# Patient Record
Sex: FEMALE | Race: WHITE | NOT HISPANIC OR LATINO | Employment: STUDENT | URBAN - METROPOLITAN AREA
[De-identification: names, ages, dates, MRNs, and addresses within clinical notes are randomized per-mention and may not be internally consistent; named-entity substitution may affect disease eponyms.]

---
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
## 2022-03-27 ENCOUNTER — HOSPITAL ENCOUNTER (EMERGENCY)
Facility: CLINIC | Age: 22
Discharge: HOME | End: 2022-03-27
Attending: NURSE PRACTITIONER | Admitting: NURSE PRACTITIONER
Payer: COMMERCIAL

## 2022-03-27 VITALS
DIASTOLIC BLOOD PRESSURE: 66 MMHG | WEIGHT: 125 LBS | OXYGEN SATURATION: 99 % | HEART RATE: 98 BPM | SYSTOLIC BLOOD PRESSURE: 130 MMHG | RESPIRATION RATE: 16 BRPM | TEMPERATURE: 98 F

## 2022-03-27 DIAGNOSIS — R10.84 ABDOMINAL PAIN, GENERALIZED: ICD-10-CM

## 2022-03-27 LAB
ALBUMIN SERPL-MCNC: 3.8 G/DL (ref 3.4–5)
ALBUMIN UR-MCNC: NEGATIVE MG/DL
ALP SERPL-CCNC: 72 U/L (ref 40–150)
ALT SERPL W P-5'-P-CCNC: 12 U/L (ref 0–50)
ANION GAP SERPL CALCULATED.3IONS-SCNC: 4 MMOL/L (ref 3–14)
APPEARANCE UR: CLEAR
AST SERPL W P-5'-P-CCNC: 16 U/L (ref 0–35)
BACTERIA #/AREA URNS HPF: ABNORMAL /HPF
BASOPHILS # BLD AUTO: 0.1 10E3/UL (ref 0–0.2)
BASOPHILS NFR BLD AUTO: 1 %
BILIRUB SERPL-MCNC: 0.3 MG/DL (ref 0.2–1.3)
BILIRUB UR QL STRIP: NEGATIVE
BUN SERPL-MCNC: 12 MG/DL (ref 7–30)
CALCIUM SERPL-MCNC: 8.8 MG/DL (ref 8.5–10.1)
CHLORIDE BLD-SCNC: 105 MMOL/L (ref 96–110)
CO2 SERPL-SCNC: 28 MMOL/L (ref 20–32)
COLOR UR AUTO: ABNORMAL
CREAT SERPL-MCNC: 0.78 MG/DL (ref 0.5–1)
EOSINOPHIL # BLD AUTO: 0 10E3/UL (ref 0–0.7)
EOSINOPHIL NFR BLD AUTO: 0 %
ERYTHROCYTE [DISTWIDTH] IN BLOOD BY AUTOMATED COUNT: 12.6 % (ref 10–15)
GFR SERPL CREATININE-BSD FRML MDRD: >90 ML/MIN/1.73M2
GLUCOSE BLD-MCNC: 104 MG/DL (ref 70–99)
GLUCOSE UR STRIP-MCNC: NEGATIVE MG/DL
HCG UR QL: NEGATIVE
HCT VFR BLD AUTO: 38.4 % (ref 35–47)
HGB BLD-MCNC: 13 G/DL (ref 11.7–15.7)
HGB UR QL STRIP: NEGATIVE
IMM GRANULOCYTES # BLD: 0 10E3/UL
IMM GRANULOCYTES NFR BLD: 1 %
KETONES UR STRIP-MCNC: NEGATIVE MG/DL
LEUKOCYTE ESTERASE UR QL STRIP: NEGATIVE
LYMPHOCYTES # BLD AUTO: 1.1 10E3/UL (ref 0.8–5.3)
LYMPHOCYTES NFR BLD AUTO: 15 %
MCH RBC QN AUTO: 30.4 PG (ref 26.5–33)
MCHC RBC AUTO-ENTMCNC: 33.9 G/DL (ref 31.5–36.5)
MCV RBC AUTO: 90 FL (ref 78–100)
MONOCYTES # BLD AUTO: 0.4 10E3/UL (ref 0–1.3)
MONOCYTES NFR BLD AUTO: 5 %
MUCOUS THREADS #/AREA URNS LPF: PRESENT /LPF
NEUTROPHILS # BLD AUTO: 5.7 10E3/UL (ref 1.6–8.3)
NEUTROPHILS NFR BLD AUTO: 78 %
NITRATE UR QL: NEGATIVE
NRBC # BLD AUTO: 0 10E3/UL
NRBC BLD AUTO-RTO: 0 /100
PH UR STRIP: 6 [PH] (ref 5–7)
PLATELET # BLD AUTO: 262 10E3/UL (ref 150–450)
POTASSIUM BLD-SCNC: 3.7 MMOL/L (ref 3.4–5.3)
PROT SERPL-MCNC: 7.3 G/DL (ref 6.8–8.8)
RBC # BLD AUTO: 4.28 10E6/UL (ref 3.8–5.2)
RBC URINE: <1 /HPF
SODIUM SERPL-SCNC: 137 MMOL/L (ref 133–144)
SP GR UR STRIP: 1.01 (ref 1–1.03)
SQUAMOUS EPITHELIAL: 1 /HPF
UROBILINOGEN UR STRIP-MCNC: NORMAL MG/DL
WBC # BLD AUTO: 7.2 10E3/UL (ref 4–11)
WBC URINE: 1 /HPF

## 2022-03-27 PROCEDURE — 80053 COMPREHEN METABOLIC PANEL: CPT | Performed by: NURSE PRACTITIONER

## 2022-03-27 PROCEDURE — 85025 COMPLETE CBC W/AUTO DIFF WBC: CPT | Performed by: NURSE PRACTITIONER

## 2022-03-27 PROCEDURE — 36415 COLL VENOUS BLD VENIPUNCTURE: CPT | Performed by: NURSE PRACTITIONER

## 2022-03-27 PROCEDURE — 99282 EMERGENCY DEPT VISIT SF MDM: CPT | Performed by: NURSE PRACTITIONER

## 2022-03-27 PROCEDURE — 81001 URINALYSIS AUTO W/SCOPE: CPT | Performed by: NURSE PRACTITIONER

## 2022-03-27 PROCEDURE — 81025 URINE PREGNANCY TEST: CPT | Performed by: NURSE PRACTITIONER

## 2022-03-27 PROCEDURE — 99283 EMERGENCY DEPT VISIT LOW MDM: CPT

## 2022-03-28 NOTE — DISCHARGE INSTRUCTIONS
Your labs and urine test are reassuring and show no worrisome findings.  Continue to monitor your symptoms as discussed.  Return for fevers, vomiting, increased pain, or any other worsening symptoms.

## 2022-03-28 NOTE — ED PROVIDER NOTES
History     Chief Complaint   Patient presents with     Abdominal Pain     HPI  Felicia Jewell is a 21 year old female who is accompanied by her mother for evaluation of abdominal pain.  Symptoms started this morning.  Patient describes feeling of cramping and severe pain that has been intermittent.  This started when she was eating this morning.  She feels a bit bloated and she has had 1 episode of diarrhea.  She has felt like she was going to have diarrhea couple more times.  Last menstrual period was 3 weeks ago, normal.  No vaginal bleeding or discharge.    Allergies:  Allergies   Allergen Reactions     Penicillins        Problem List:    There are no problems to display for this patient.       Past Medical History:    History reviewed. No pertinent past medical history.    Past Surgical History:    History reviewed. No pertinent surgical history.    Family History:    History reviewed. No pertinent family history.    Social History:  Marital Status:  Single [1]  Social History     Tobacco Use     Smoking status: Never Smoker     Smokeless tobacco: Never Used   Substance Use Topics     Alcohol use: Never     Drug use: Never        Medications:    cetirizine (ZYRTEC) 10 MG tablet  desogestrel-ethinyl estradiol (APRI) 0.15-30 MG-MCG tablet          Review of Systems  As mentioned above in the history present illness. All other systems were reviewed and are negative.    Physical Exam   BP: 133/79  Pulse: 100  Temp: 98.7  F (37.1  C)  Resp: 18  Weight: 56.7 kg (125 lb)  SpO2: 100 %      Physical Exam  Constitutional:       General: She is not in acute distress.     Appearance: Normal appearance. She is well-developed. She is not ill-appearing.   HENT:      Head: Normocephalic and atraumatic.      Right Ear: Tympanic membrane and external ear normal.      Left Ear: Tympanic membrane and external ear normal.      Nose: Nose normal.      Mouth/Throat:      Pharynx: Oropharynx is clear.   Eyes:       Conjunctiva/sclera: Conjunctivae normal.   Cardiovascular:      Rate and Rhythm: Normal rate and regular rhythm.      Heart sounds: No murmur heard.  Pulmonary:      Effort: Pulmonary effort is normal. No respiratory distress.      Breath sounds: Normal breath sounds.   Abdominal:      General: Bowel sounds are normal. There is no distension.      Palpations: Abdomen is soft. There is no hepatomegaly or splenomegaly.      Tenderness: There is abdominal tenderness (somewhat more TTP over periumbilical) in the right lower quadrant, periumbilical area, suprapubic area and left lower quadrant. There is no right CVA tenderness, left CVA tenderness or guarding. Negative signs include Peres's sign and McBurney's sign.      Comments: Pounding on bottom of feet causes no peritoneal irritation.     Skin:     General: Skin is warm and dry.   Neurological:      General: No focal deficit present.      Mental Status: She is alert and oriented to person, place, and time.         ED Course           Procedures       Results for orders placed or performed during the hospital encounter of 03/27/22 (from the past 24 hour(s))   CBC with platelets differential    Narrative    The following orders were created for panel order CBC with platelets differential.  Procedure                               Abnormality         Status                     ---------                               -----------         ------                     CBC with platelets and d...[[id number]]                      Final result                 Please view results for these tests on the individual orders.   Comprehensive metabolic panel   Result Value Ref Range    Sodium 137 133 - 144 mmol/L    Potassium 3.7 3.4 - 5.3 mmol/L    Chloride 105 96 - 110 mmol/L    Carbon Dioxide (CO2) 28 20 - 32 mmol/L    Anion Gap 4 3 - 14 mmol/L    Urea Nitrogen 12 7 - 30 mg/dL    Creatinine 0.78 0.50 - 1.00 mg/dL    Calcium 8.8 8.5 - 10.1 mg/dL    Glucose 104 (H) 70 - 99 mg/dL     Alkaline Phosphatase 72 40 - 150 U/L    AST 16 0 - 35 U/L    ALT 12 0 - 50 U/L    Protein Total 7.3 6.8 - 8.8 g/dL    Albumin 3.8 3.4 - 5.0 g/dL    Bilirubin Total 0.3 0.2 - 1.3 mg/dL    GFR Estimate >90 >60 mL/min/1.73m2   UA with Microscopic reflex to Culture    Specimen: Urine, Midstream   Result Value Ref Range    Color Urine Straw Colorless, Straw, Light Yellow, Yellow    Appearance Urine Clear Clear    Glucose Urine Negative Negative mg/dL    Bilirubin Urine Negative Negative    Ketones Urine Negative Negative mg/dL    Specific Gravity Urine 1.015 1.003 - 1.035    Blood Urine Negative Negative    pH Urine 6.0 5.0 - 7.0    Protein Albumin Urine Negative Negative mg/dL    Urobilinogen Urine Normal Normal, 2.0 mg/dL    Nitrite Urine Negative Negative    Leukocyte Esterase Urine Negative Negative    Bacteria Urine Few (A) None Seen /HPF    Mucus Urine Present (A) None Seen /LPF    RBC Urine <1 <=2 /HPF    WBC Urine 1 <=5 /HPF    Squamous Epithelials Urine 1 <=1 /HPF    Narrative    Urine Culture not indicated   HCG qualitative urine (UPT)   Result Value Ref Range    hCG Urine Qualitative Negative Negative   CBC with platelets and differential   Result Value Ref Range    WBC Count 7.2 4.0 - 11.0 10e3/uL    RBC Count 4.28 3.80 - 5.20 10e6/uL    Hemoglobin 13.0 11.7 - 15.7 g/dL    Hematocrit 38.4 35.0 - 47.0 %    MCV 90 78 - 100 fL    MCH 30.4 26.5 - 33.0 pg    MCHC 33.9 31.5 - 36.5 g/dL    RDW 12.6 10.0 - 15.0 %    Platelet Count 262 150 - 450 10e3/uL    % Neutrophils 78 %    % Lymphocytes 15 %    % Monocytes 5 %    % Eosinophils 0 %    % Basophils 1 %    % Immature Granulocytes 1 %    NRBCs per 100 WBC 0 <1 /100    Absolute Neutrophils 5.7 1.6 - 8.3 10e3/uL    Absolute Lymphocytes 1.1 0.8 - 5.3 10e3/uL    Absolute Monocytes 0.4 0.0 - 1.3 10e3/uL    Absolute Eosinophils 0.0 0.0 - 0.7 10e3/uL    Absolute Basophils 0.1 0.0 - 0.2 10e3/uL    Absolute Immature Granulocytes 0.0 <=0.4 10e3/uL    Absolute NRBCs 0.0 10e3/uL        Medications - No data to display    Assessments & Plan (with Medical Decision Making)      21 year old female who is accompanied by her mother for evaluation of abdominal pain.  Symptoms started this morning.  Patient describes feeling of cramping and severe pain that has been intermittent.  This started when she was eating this morning.  She feels a bit bloated and she has had 1 episode of diarrhea.  She has felt like she was going to have diarrhea couple more times.  Last menstrual period was 3 weeks ago, normal.  No vaginal bleeding or discharge.    On exam patient is alert and oriented.  Pleasant.  Afebrile.  Normotensive.  No tachycardia.  Abdomen is soft.  Diffusely tender in the lower quadrants and periumbilical region.  However, she has no significant pain at rest.  She did have 1 episode where the pain came back and lasted for a few minutes but then went away.  No leukocytosis.  UA is negative for evidence of infection.  UPT is negative.  I did reexamine her abdomen and there is no change.  I had a long discussion with patient and her mother about next steps.  We do have the option to proceed to CT scan for evaluation any emergent/surgical abdomen.  We can also do watchful waiting.  Patient is very mature for her age and her mother is very reliable.  They understand that we have not completely ruled out appendicitis.  Her symptoms are not classic/consistent with appendicitis but her symptoms just started today.  Patient and her mother agree to watchful waiting.  Patient will have a low threshold for returning if her symptoms increase, fevers, vomiting, or any other new symptoms of concern.    Discharge Medication List as of 3/27/2022 10:24 PM          Final diagnoses:   Abdominal pain, generalized       3/27/2022   [facility] EMERGENCY DEPT     Bhavya, RAFFAELE Kincaid CNP  03/28/22 0022

## 2022-05-07 ENCOUNTER — HOSPITAL ENCOUNTER (EMERGENCY)
Facility: CLINIC | Age: 22
Discharge: HOME | End: 2022-05-07
Attending: EMERGENCY MEDICINE | Admitting: EMERGENCY MEDICINE
Payer: COMMERCIAL

## 2022-05-07 VITALS
DIASTOLIC BLOOD PRESSURE: 81 MMHG | SYSTOLIC BLOOD PRESSURE: 116 MMHG | TEMPERATURE: 98 F | OXYGEN SATURATION: 99 % | RESPIRATION RATE: 18 BRPM | HEART RATE: 61 BPM

## 2022-05-07 DIAGNOSIS — L25.9 CONTACT DERMATITIS, UNSPECIFIED CONTACT DERMATITIS TYPE, UNSPECIFIED TRIGGER: ICD-10-CM

## 2022-05-07 DIAGNOSIS — R30.0 DYSURIA: ICD-10-CM

## 2022-05-07 LAB
ALBUMIN UR-MCNC: NEGATIVE MG/DL
APPEARANCE UR: ABNORMAL
BACTERIA #/AREA URNS HPF: ABNORMAL /HPF
BILIRUB UR QL STRIP: NEGATIVE
COLOR UR AUTO: YELLOW
GLUCOSE UR STRIP-MCNC: NEGATIVE MG/DL
HGB UR QL STRIP: NEGATIVE
KETONES UR STRIP-MCNC: NEGATIVE MG/DL
LEUKOCYTE ESTERASE UR QL STRIP: NEGATIVE
MUCOUS THREADS #/AREA URNS LPF: PRESENT /LPF
NITRATE UR QL: NEGATIVE
PH UR STRIP: 5 [PH] (ref 5–7)
RBC URINE: 1 /HPF
SP GR UR STRIP: 1.03 (ref 1–1.03)
SQUAMOUS EPITHELIAL: 1 /HPF
UROBILINOGEN UR STRIP-MCNC: NORMAL MG/DL
WBC URINE: <1 /HPF

## 2022-05-07 PROCEDURE — 99284 EMERGENCY DEPT VISIT MOD MDM: CPT | Performed by: EMERGENCY MEDICINE

## 2022-05-07 PROCEDURE — 81003 URINALYSIS AUTO W/O SCOPE: CPT | Performed by: EMERGENCY MEDICINE

## 2022-05-07 RX ORDER — FLUCONAZOLE 150 MG/1
TABLET ORAL
Qty: 2 TABLET | Refills: 0 | Status: SHIPPED | OUTPATIENT
Start: 2022-05-07 | End: 2022-05-10

## 2022-05-07 RX ORDER — TRIAMCINOLONE ACETONIDE 1 MG/G
CREAM TOPICAL
Qty: 30 G | Refills: 0 | Status: SHIPPED | OUTPATIENT
Start: 2022-05-07 | End: 2022-05-21

## 2022-05-07 RX ORDER — SULFAMETHOXAZOLE/TRIMETHOPRIM 800-160 MG
1 TABLET ORAL 2 TIMES DAILY
Qty: 6 TABLET | Refills: 0 | Status: SHIPPED | OUTPATIENT
Start: 2022-05-07 | End: 2022-05-10

## 2022-05-07 NOTE — ED NOTES
Reviewed discharge instruction, verbalized understanding. No questions or concerns.  Meds reviewed.

## 2022-05-07 NOTE — ED PROVIDER NOTES
History     Chief Complaint   Patient presents with     Dysuria     Rash     HPI  Felicia Jewell is a 21 year old female who presents to the emergency room for concerns of urinary tract infection and rash.  Symptoms started on Monday, noticed frequency and burning with urination.  Had similar symptoms before when she had a urinary tract infection.  Has not been running a fever, denies any abdominal pain, no vomiting.  No concern for sexually transmitted infection.    Additionally, is concerned about new rash on anterior neck.  Developed yesterday.  It is itchy and not improving.  Has been trying to use topical Benadryl cream without any relief.  Has never had a rash like this before, no new soaps or detergents, nothing else new that she is come in contact with that could be causing this rash.  No difficulty breathing or swelling of her lips or tongue.  Is wearing 2 necklaces, but says she is worn these daily for the last 5 years and has never had a reaction    Allergies:  Allergies   Allergen Reactions     Penicillins        Problem List:    There are no problems to display for this patient.       Past Medical History:    History reviewed. No pertinent past medical history.    Past Surgical History:    History reviewed. No pertinent surgical history.    Family History:    History reviewed. No pertinent family history.    Social History:  Marital Status:  Single [1]  Social History     Tobacco Use     Smoking status: Never Smoker     Smokeless tobacco: Never Used   Substance Use Topics     Alcohol use: Never     Drug use: Never        Medications:    fluconazole (DIFLUCAN) 150 MG tablet  sulfamethoxazole-trimethoprim (BACTRIM DS) 800-160 MG tablet  triamcinolone (KENALOG) 0.1 % external cream  cetirizine (ZYRTEC) 10 MG tablet  desogestrel-ethinyl estradiol (APRI) 0.15-30 MG-MCG tablet          Review of Systems   All other systems reviewed and are negative.      Physical Exam   BP: 116/81  Pulse:  61  Temp: 98  F (36.7  C)  Resp: 18  SpO2: 99 %      Physical Exam  Vitals and nursing note reviewed.   Constitutional:       General: She is not in acute distress.     Appearance: She is not diaphoretic.   HENT:      Head: Atraumatic.      Mouth/Throat:      Pharynx: No oropharyngeal exudate.   Eyes:      General: No scleral icterus.     Pupils: Pupils are equal, round, and reactive to light.   Cardiovascular:      Heart sounds: Normal heart sounds.   Pulmonary:      Effort: Pulmonary effort is normal.   Musculoskeletal:      Cervical back: Normal range of motion and neck supple.   Skin:     General: Skin is warm.      Comments: See photo below   Neurological:      Mental Status: She is alert.             ED Course                 Procedures              Critical Care time:  none               Results for orders placed or performed during the hospital encounter of 05/07/22 (from the past 24 hour(s))   UA with Microscopic reflex to Culture    Specimen: Urine, Clean Catch   Result Value Ref Range    Color Urine Yellow Colorless, Straw, Light Yellow, Yellow    Appearance Urine Slightly Cloudy (A) Clear    Glucose Urine Negative Negative mg/dL    Bilirubin Urine Negative Negative    Ketones Urine Negative Negative mg/dL    Specific Gravity Urine 1.027 1.003 - 1.035    Blood Urine Negative Negative    pH Urine 5.0 5.0 - 7.0    Protein Albumin Urine Negative Negative mg/dL    Urobilinogen Urine Normal Normal, 2.0 mg/dL    Nitrite Urine Negative Negative    Leukocyte Esterase Urine Negative Negative    Bacteria Urine Few (A) None Seen /HPF    Mucus Urine Present (A) None Seen /LPF    RBC Urine 1 <=2 /HPF    WBC Urine <1 <=5 /HPF    Squamous Epithelials Urine 1 <=1 /HPF    Narrative    Urine Culture not indicated       Medications - No data to display    Assessments & Plan (with Medical Decision Making)  Esperanza is a 21-year-old female presenting to the emergency room for dysuria and rash on neck.  See history and focused  physical exam as above  Pleasant 21-year-old female in no acute distress, stable vital signs.  No acute concerning physical exam findings other than rash, noted in picture above.  No vesicles, no weeping, no signs of infection.  Consistent with contact dermatitis, but patient does not know of any potential trigger.  Will prescribe topical steroid to help.  She also provided a urine sample for evaluation of dysuria.  Results as above.  There are few bacteria but no white blood cells, negative leukocyte esterase and nitrites.  No convincing evidence for UTI.  Since it is the weekend and she will be due to go out of town soon, provided with a prescription for Bactrim DS twice daily x3 days.  Told her to give this another 1 to 2 days and if symptoms persist or worsen, can try taking the antibiotic to treat UTI.  Since she complained of discharge, but no vulvovaginitis or pruritus, could trial oral Diflucan to see if this is due to yeast infection, though I have lower suspicion of this diagnosis.  Follow-up with her primary provider as needed.  Discharged in no distress     I have reviewed the nursing notes.    I have reviewed the findings, diagnosis, plan and need for follow up with the patient.       New Prescriptions    FLUCONAZOLE (DIFLUCAN) 150 MG TABLET    Take one tablet now, and one tablet in three days    SULFAMETHOXAZOLE-TRIMETHOPRIM (BACTRIM DS) 800-160 MG TABLET    Take 1 tablet by mouth 2 times daily for 3 days    TRIAMCINOLONE (KENALOG) 0.1 % EXTERNAL CREAM    Apply to affected area twice daily       Final diagnoses:   Dysuria   Contact dermatitis, unspecified contact dermatitis type, unspecified trigger       5/7/2022   [facility] EMERGENCY DEPT     Klarissa rAora DO  05/07/22 2136

## 2022-05-07 NOTE — ED TRIAGE NOTES
Rash on neck started yesterday - has been doing benadryl cream but is very itchy and not improving today.    C/o pain with urination and some increased discharge with odor. Denies concern for STD.      Triage Assessment     Row Name 05/07/22 1344       Triage Assessment (Adult)    Airway WDL WDL       Respiratory WDL    Respiratory WDL WDL       Cardiac WDL    Cardiac WDL WDL

## 2022-05-07 NOTE — DISCHARGE INSTRUCTIONS
Your urine did not show significant signs of infection.  You may want to wait 1 to 2 days to see if symptoms improve on their own.  If not you may fill and start to the antibiotic prescription that was provided    Additionally, a prescription for Diflucan was sent to the pharmacy.  This would help to treat yeast infection.  You may take 1 tablet at the beginning of the antibiotic prescription and 1 tablet when it is finished    For the rash, you may use the topical steroid cream prescribed.  Apply once or twice daily.  If you develop any other areas of irritation, you are welcome to use this cream on those sites.  You may take over-the-counter oral Benadryl or Zyrtec per bottle instructions to help with any additional symptoms    Follow-up with your primary provider as needed

## 2022-11-25 ENCOUNTER — HOSPITAL ENCOUNTER (EMERGENCY)
Facility: CLINIC | Age: 22
Discharge: HOME | End: 2022-11-25
Attending: EMERGENCY MEDICINE | Admitting: EMERGENCY MEDICINE
Payer: COMMERCIAL

## 2022-11-25 VITALS
DIASTOLIC BLOOD PRESSURE: 88 MMHG | WEIGHT: 125 LBS | RESPIRATION RATE: 20 BRPM | HEART RATE: 110 BPM | OXYGEN SATURATION: 100 % | BODY MASS INDEX: 19.62 KG/M2 | SYSTOLIC BLOOD PRESSURE: 137 MMHG | HEIGHT: 67 IN | TEMPERATURE: 98.8 F

## 2022-11-25 DIAGNOSIS — R01.1 HEART MURMUR: ICD-10-CM

## 2022-11-25 DIAGNOSIS — J01.90 ACUTE SINUSITIS WITH SYMPTOMS > 10 DAYS: ICD-10-CM

## 2022-11-25 PROCEDURE — 99284 EMERGENCY DEPT VISIT MOD MDM: CPT | Performed by: EMERGENCY MEDICINE

## 2022-11-25 PROCEDURE — 99283 EMERGENCY DEPT VISIT LOW MDM: CPT | Performed by: EMERGENCY MEDICINE

## 2022-11-25 RX ORDER — CEFDINIR 300 MG/1
300 CAPSULE ORAL 2 TIMES DAILY
Qty: 20 CAPSULE | Refills: 0 | Status: SHIPPED | OUTPATIENT
Start: 2022-11-25 | End: 2022-12-05

## 2022-11-25 ASSESSMENT — ACTIVITIES OF DAILY LIVING (ADL): ADLS_ACUITY_SCORE: 35

## 2022-11-25 NOTE — ED TRIAGE NOTES
Pt reports nasal congestion for about a week. States facial pressure started Tuesday.      Triage Assessment     Row Name 11/25/22 1010       Triage Assessment (Adult)    Airway WDL WDL       Respiratory WDL    Respiratory WDL --  nasal congestion       Skin Circulation/Temperature WDL    Skin Circulation/Temperature WDL WDL

## 2022-11-25 NOTE — DISCHARGE INSTRUCTIONS
Try to drink lots of fluids.    Take ibuprofen or Aleve to decrease pain and inflammation.    I recommend starting Flonase daily to help decrease inflammation and open the airways.    Start antibiotics as prescribed.  It is a good idea to take probiotics or eat yogurt or drink computer while you are on antibiotics to help replenish normal gut bacteria.    Follow-up in clinic next week if not improving.  Return for worsening, changes or concerns.    I did hear a small flow murmur.  I recommend having this rechecked at your next primary care visit.    I hope you feel much better quickly!!

## 2022-11-26 NOTE — ED PROVIDER NOTES
"  History     Chief Complaint   Patient presents with     Nasal Congestion     HPI  History per patient, medical records    This is a 22-year-old female, otherwise healthy, presenting with nasal congestion.  Patient started having just general cold symptoms about 10 or 11 days ago.  She thought she was improving until 3 days ago when she started having increasing congestion.  She notes pressure in the maxillary sinuses.  She has had the sensation of her right ear being clogged.  She has a headache.  She has been trying Tylenol Cold and flu and [place] plus without relief.  She has had a cough since the end of July.  In August she was seen for sinus infection and received Augmentin and a Medrol Dosepak with improvement in symptoms.  She denies any fevers or chills.  No current chest pain or shortness of breath.  She has had a little scratchy throat.  No nausea, vomiting, bowel or bladder changes.  No rash.    Allergies:  Allergies   Allergen Reactions     Penicillins        Problem List:    There are no problems to display for this patient.       Past Medical History:    History reviewed. No pertinent past medical history.    Past Surgical History:    History reviewed. No pertinent surgical history.    Family History:    History reviewed. No pertinent family history.    Social History:  Marital Status:  Single [1]  Social History     Tobacco Use     Smoking status: Never     Smokeless tobacco: Never   Substance Use Topics     Alcohol use: Never     Drug use: Never        Medications:    cefdinir (OMNICEF) 300 MG capsule  cetirizine (ZYRTEC) 10 MG tablet  desogestrel-ethinyl estradiol (APRI) 0.15-30 MG-MCG tablet          Review of Systems   All other ROS reviewed and are negative or non-contributory except as stated in HPI.     Physical Exam   BP: 137/88  Pulse: 110  Temp: 98.8  F (37.1  C)  Resp: 20  Height: 170.2 cm (5' 7\")  Weight: 56.7 kg (125 lb)  SpO2: 100 %      Physical Exam  Vitals and nursing note " reviewed.   Constitutional:       Appearance: Normal appearance. She is normal weight.      Comments: Young, healthy-appearing female sitting in the bed.  She sounds congested.   HENT:      Head: Normocephalic.      Right Ear: Tympanic membrane and ear canal normal.      Left Ear: Tympanic membrane and ear canal normal.      Nose: Congestion and rhinorrhea present.      Mouth/Throat:      Mouth: Mucous membranes are moist.      Pharynx: Oropharynx is clear.   Eyes:      General: No scleral icterus.     Extraocular Movements: Extraocular movements intact.      Conjunctiva/sclera: Conjunctivae normal.   Cardiovascular:      Rate and Rhythm: Regular rhythm. Tachycardia present.      Pulses: Normal pulses.      Heart sounds: Normal heart sounds.   Pulmonary:      Effort: Pulmonary effort is normal.      Breath sounds: Normal breath sounds.   Musculoskeletal:         General: Normal range of motion.      Cervical back: Normal range of motion and neck supple.   Skin:     General: Skin is warm and dry.      Findings: No rash.   Neurological:      General: No focal deficit present.      Mental Status: She is alert and oriented to person, place, and time.   Psychiatric:         Mood and Affect: Mood normal.         Behavior: Behavior normal.         ED Course (with Medical Decision Making)    Pt seen and examined by me.  RN and EPIC notes reviewed.      Young female with cold symptoms for over a week that have progressively worsened to now with facial pain and pressure, congestion, ear pain and pressure.  On exam she sounds congested and appears to have a sinus infection.  Since that she has had symptoms for over 10 days and is not improving despite over-the-counter measures I am going to treat her with antibiotics.  Patient given an Rx for Omnicef for 10 days.  We also talked about trying Flonase.  She can use Mucinex, any other over-the-counter medications if desired.  Continue to monitor symptoms.  If she is not improving  over the week follow-up in clinic and return as needed for worsening, changes or concerns.         Procedures         No results found for this or any previous visit (from the past 24 hour(s)).    Medications - No data to display    Assessments & Plan      I have reviewed the findings, diagnosis, plan and need for follow up with the patient.    Discharge Medication List as of 11/25/2022 11:08 AM      START taking these medications    Details   cefdinir (OMNICEF) 300 MG capsule Take 1 capsule (300 mg) by mouth 2 times daily for 10 days, Disp-20 capsule, R-0, E-Prescribe             Final diagnoses:   Acute sinusitis with symptoms > 10 days   Heart murmur     Disposition: Patient discharged home in stable condition.  Plan as above.  Return for concerns.     Note: Chart documentation done in part with Dragon Voice Recognition software. Although reviewed after completion, some word and grammatical errors may remain.     11/25/2022   [facility] EMERGENCY DEPT     Tanya Mcmillan MD  11/25/22 9849

## 2024-03-01 ENCOUNTER — HOSPITAL ENCOUNTER (EMERGENCY)
Facility: CLINIC | Age: 24
Discharge: HOME | End: 2024-03-01
Attending: FAMILY MEDICINE | Admitting: FAMILY MEDICINE
Payer: COMMERCIAL

## 2024-03-01 VITALS
SYSTOLIC BLOOD PRESSURE: 122 MMHG | OXYGEN SATURATION: 98 % | HEIGHT: 67 IN | DIASTOLIC BLOOD PRESSURE: 81 MMHG | RESPIRATION RATE: 20 BRPM | TEMPERATURE: 98.7 F | BODY MASS INDEX: 19.58 KG/M2 | HEART RATE: 97 BPM

## 2024-03-01 DIAGNOSIS — B97.89 SORE THROAT (VIRAL): ICD-10-CM

## 2024-03-01 DIAGNOSIS — J02.8 SORE THROAT (VIRAL): ICD-10-CM

## 2024-03-01 DIAGNOSIS — J04.0 LARYNGITIS, ACUTE: ICD-10-CM

## 2024-03-01 LAB
DEPRECATED S PYO AG THROAT QL EIA: NEGATIVE
FLUAV RNA SPEC QL NAA+PROBE: NEGATIVE
FLUBV RNA RESP QL NAA+PROBE: NEGATIVE
RSV RNA SPEC NAA+PROBE: NEGATIVE
SARS-COV-2 RNA RESP QL NAA+PROBE: NEGATIVE

## 2024-03-01 PROCEDURE — 87637 SARSCOV2&INF A&B&RSV AMP PRB: CPT | Performed by: FAMILY MEDICINE

## 2024-03-01 PROCEDURE — 99283 EMERGENCY DEPT VISIT LOW MDM: CPT

## 2024-03-01 PROCEDURE — 87651 STREP A DNA AMP PROBE: CPT | Performed by: FAMILY MEDICINE

## 2024-03-01 PROCEDURE — 99283 EMERGENCY DEPT VISIT LOW MDM: CPT | Performed by: FAMILY MEDICINE

## 2024-03-01 ASSESSMENT — ACTIVITIES OF DAILY LIVING (ADL)
ADLS_ACUITY_SCORE: 35
ADLS_ACUITY_SCORE: 35

## 2024-03-01 ASSESSMENT — COLUMBIA-SUICIDE SEVERITY RATING SCALE - C-SSRS
6. HAVE YOU EVER DONE ANYTHING, STARTED TO DO ANYTHING, OR PREPARED TO DO ANYTHING TO END YOUR LIFE?: NO
2. HAVE YOU ACTUALLY HAD ANY THOUGHTS OF KILLING YOURSELF IN THE PAST MONTH?: NO
1. IN THE PAST MONTH, HAVE YOU WISHED YOU WERE DEAD OR WISHED YOU COULD GO TO SLEEP AND NOT WAKE UP?: NO

## 2024-03-02 LAB — GROUP A STREP BY PCR: NOT DETECTED

## 2024-03-02 ASSESSMENT — ENCOUNTER SYMPTOMS
VOICE CHANGE: 1
FEVER: 0
SORE THROAT: 1

## 2024-03-02 NOTE — ED PROVIDER NOTES
"  History     Chief Complaint   Patient presents with    Pharyngitis     HPI  Felicia Jewell is a 23 year old female who presents to the ER with concerns about Sore throat pain issues. She states the symptoms started 5 days ago.  She states that she was seen in an urgent care 3 days ago and had a rapid strep screen performed which proved negative but her pain continues.  She denies any significant swelling to the posterior throat.  Its painful to swallow at times.  She denies any significant fever, rash, shortness of breath, or chest pain symptoms.        I reviewed the following nurse triage note:  Pt reports sore throat started Sunday, was seen Tuesday in the  and had a negative strep test. Pt reports she continues to have a sore throat that hasn't improved with ibuprofen or cough drops.        Allergies:  Allergies   Allergen Reactions    Penicillins        Problem List:    There are no problems to display for this patient.       Past Medical History:    History reviewed. No pertinent past medical history.    Past Surgical History:    History reviewed. No pertinent surgical history.    Family History:    History reviewed. No pertinent family history.    Social History:  Marital Status:  Single [1]  Social History     Tobacco Use    Smoking status: Never    Smokeless tobacco: Never   Substance Use Topics    Alcohol use: Never    Drug use: Never        Medications:    cetirizine (ZYRTEC) 10 MG tablet  desogestrel-ethinyl estradiol (APRI) 0.15-30 MG-MCG tablet          Review of Systems   Constitutional:  Negative for fever.   HENT:  Positive for sore throat and voice change.    All other systems reviewed and are negative.      Physical Exam   BP: 122/81  Pulse: 97  Temp: 98.7  F (37.1  C)  Resp: 20  Height: 170.2 cm (5' 7\")  SpO2: 98 %      Physical Exam  Vitals and nursing note reviewed.   Constitutional:       General: She is in acute distress (mild).      Appearance: She is well-developed.   HENT:    "   Nose: Congestion (Mild) present.      Mouth/Throat:      Mouth: Mucous membranes are moist. No oral lesions.      Pharynx: No pharyngeal swelling, oropharyngeal exudate, posterior oropharyngeal erythema or uvula swelling.      Tonsils: No tonsillar exudate or tonsillar abscesses. 1+ on the right. 1+ on the left.   Eyes:      Conjunctiva/sclera: Conjunctivae normal.      Pupils: Pupils are equal, round, and reactive to light.   Cardiovascular:      Rate and Rhythm: Normal rate.   Pulmonary:      Effort: Pulmonary effort is normal. No respiratory distress.   Musculoskeletal:      Cervical back: Normal range of motion and neck supple.   Skin:     Capillary Refill: Capillary refill takes less than 2 seconds.      Findings: No rash.   Neurological:      Mental Status: She is alert and oriented to person, place, and time.         ED Course        Procedures              Critical Care time:  none               Results for orders placed or performed during the hospital encounter of 03/01/24 (from the past 24 hour(s))   Streptococcus A Rapid Screen w/Reflex to PCR    Specimen: Throat; Swab   Result Value Ref Range    Group A Strep antigen Negative Negative   Symptomatic Influenza A/B, RSV, & SARS-CoV2 PCR (COVID-19) Nasopharyngeal    Specimen: Nasopharyngeal; Swab   Result Value Ref Range    Influenza A PCR Negative Negative    Influenza B PCR Negative Negative    RSV PCR Negative Negative    SARS CoV2 PCR Negative Negative    Narrative    Testing was performed using the Xpert Xpress CoV2/Flu/RSV Assay on the [employer] GeneXpert Instrument. This test should be ordered for the detection of SARS-CoV-2, influenza, and RSV viruses in individuals who meet clinical and/or epidemiological criteria. Test performance is unknown in asymptomatic patients. This test is for in vitro diagnostic use under the FDA EUA for laboratories certified under CLIA to perform high or moderate complexity testing. This test has not been FDA cleared or  approved. A negative result does not rule out the presence of PCR inhibitors in the specimen or target RNA in concentration below the limit of detection for the assay. If only one viral target is positive but coinfection with multiple targets is suspected, the sample should be re-tested with another FDA cleared, approved, or authorized test, if coinfection would change clinical management. This test was validated by the [facility]. These laboratories are certified under the Clinical Laboratory Improvement Amendments of 1988 (CLIA-88) as qualified to perform high complexity laboratory testing.       Medications - No data to display    Assessments & Plan (with Medical Decision Making)  Patient with exam findings and symptoms suggestive of viral upper respiratory infection with sore throat and voice change consistent with laryngitis.  She was given instructions on signs and symptoms that would be of concern and when to return to the ER.  I sent her home with a handout discussing laryngitis and have asked her to read and follow those instructions as well.  Conservative treatment suggested.  Encourage follow-up in our clinic if not improving next 3 to 5 days.     I have reviewed the nursing notes.    I have reviewed the findings, diagnosis, plan and need for follow up with the patient.           Medical Decision Making  The patient's presentation was of moderate complexity (an acute illness with systemic symptoms).    The patient's evaluation involved:  ordering and/or review of 2 test(s) in this encounter (see separate area of note for details)    The patient's management necessitated only low risk treatment.        Discharge Medication List as of 3/1/2024 10:18 PM          Final diagnoses:   Laryngitis, acute   Sore throat (viral)       3/1/2024   [facility] EMERGENCY DEPT       Kamlesh Salguero,   03/02/24 0437

## 2024-03-02 NOTE — ED TRIAGE NOTES
Pt reports sore throat started Sunday, was seen Tuesday in the UC and had a negative strep test. Pt reports she continues to have a sore throat that hasn't improved with ibuprofen or cough drops.      Triage Assessment (Adult)       Row Name 03/01/24 2031          Triage Assessment    Airway WDL WDL        Respiratory WDL    Respiratory WDL WDL        Skin Circulation/Temperature WDL    Skin Circulation/Temperature WDL WDL        Cardiac WDL    Cardiac WDL WDL        Peripheral/Neurovascular WDL    Peripheral Neurovascular WDL WDL        Cognitive/Neuro/Behavioral WDL    Cognitive/Neuro/Behavioral WDL WDL

## 2024-04-02 NOTE — ED PROVIDER NOTES
History     Chief Complaint   Patient presents with     Facial Swelling     HPI  Felicia Jewell is a 20 year old female who to the emergency room today secondary to concerns of not feeling well x6 days.  Patient states that she flew to [place] last Sunday and on Sunday night she developed a sense of mild swelling in her face with some sore throat symptoms.  She has had symptoms on and off since.  She also has had some sore throat and a slight cough.  She is not sure if she has had fever or not but thinks she might of had some fever on and off.  She states that she tried some antihistamines without improvement in the swelling to her face.  Asked if she has any abdominal pain symptoms and she states that she has had a slight twinge in her left upper abdomen noted most prominently when she coughs but it goes away right away.  She has had decreased appetite but denied any vomiting or diarrhea symptoms.  She is not aware of any exposure to COVID-19 other than potential with traveling.  She states that she is typically otherwise healthy.    Allergies:  Allergies   Allergen Reactions     Penicillins        Problem List:    There are no active problems to display for this patient.       Past Medical History:    History reviewed. No pertinent past medical history.    Past Surgical History:    History reviewed. No pertinent surgical history.    Family History:    History reviewed. No pertinent family history.    Social History:  Marital Status:  Single [1]  Social History     Tobacco Use     Smoking status: Never Smoker     Smokeless tobacco: Never Used   Substance Use Topics     Alcohol use: Never     Frequency: Never     Drug use: Never        Medications:         acetaminophen (TYLENOL) 500 MG tablet       cetirizine (ZYRTEC) 10 MG tablet       desogestrel-ethinyl estradiol (APRI) 0.15-30 MG-MCG tablet       ibuprofen (ADVIL/MOTRIN) 200 MG tablet          Review of Systems   Constitutional: Positive for appetite  Preventive Care Visit  [facility]  Apr 2, 2024      Assessment & Plan     Routine general medical examination at a health care facility  - Basic metabolic panel  (Ca, Cl, CO2, Creat, Gluc, K, Na, BUN)    Screening for HIV (human immunodeficiency virus)  - HIV Antigen Antibody Combo    Need for hepatitis C screening test  - Hepatitis C Screen Reflex to HCV RNA Quant and Genotype    Cervical cancer screening  - Pap Screen with HPV - recommended age 30 - 65 years    Screening examination for STI  - NEISSERIA GONORRHOEA PCR  - CHLAMYDIA TRACHOMATIS PCR    Lipid screening  - Lipid Profile (Chol, Trig, HDL, LDL calc)    Screening for diabetes mellitus  - Hemoglobin A1c        Counseling  Appropriate preventive services were discussed with this patient, including applicable screening as appropriate for fall prevention, nutrition, physical activity, Tobacco-use cessation, weight loss and cognition.  Checklist reviewing preventive services available has been given to the patient.  Reviewed patient's diet, addressing concerns and/or questions.   She is at risk for lack of exercise and has been provided with information to increase physical activity for the benefit of her well-being.   The patient was instructed to see the dentist every 6 months.   She is at risk for psychosocial distress and has been provided with information to reduce risk.           Ellen Alejandro is a 40 year old, presenting for the following:  Establish Care, Physical, and Gyn Exam        4/2/2024     4:09 PM   Additional Questions   Roomed by Guicho        Health Care Directive  Patient does not have a Health Care Directive or Living Will: Discussed advance care planning with patient; information given to patient to review.    Healthy Habits:     Getting at least 3 servings of Calcium per day:  NO    Bi-annual eye exam:  Yes    Dental care twice a year:  NO    Sleep apnea or symptoms of  change, fatigue and fever.   HENT: Positive for congestion, facial swelling and sore throat.    Eyes: Negative.    Respiratory: Positive for cough. Negative for shortness of breath.    Cardiovascular: Negative for chest pain, palpitations and leg swelling.   Gastrointestinal: Positive for abdominal pain (Mild left upper quadrant rajat pain with cough.). Negative for blood in stool, diarrhea and vomiting.   Endocrine: Negative.    Genitourinary: Negative.    Musculoskeletal: Positive for myalgias.   Skin: Positive for rash (Mild redness to her face associated with the swelling.).   Allergic/Immunologic: Negative.    Neurological: Negative.    Hematological: Negative.    Psychiatric/Behavioral: Negative.    All other systems reviewed and are negative.      Physical Exam   BP: 135/80  Pulse: 104  Temp: 100.8  F (38.2  C)  Resp: 20  SpO2: 97 %      Physical Exam  Vitals signs and nursing note reviewed.   Constitutional:       General: She is in acute distress (Mild).      Appearance: She is normal weight. She is not toxic-appearing or diaphoretic.   HENT:      Head: Normocephalic.      Nose: Congestion present.      Mouth/Throat:      Pharynx: Oropharyngeal exudate and posterior oropharyngeal erythema present.   Eyes:      General: No scleral icterus.     Extraocular Movements: Extraocular movements intact.      Conjunctiva/sclera: Conjunctivae normal.      Pupils: Pupils are equal, round, and reactive to light.   Neck:      Musculoskeletal: Normal range of motion and neck supple.   Cardiovascular:      Rate and Rhythm: Tachycardia present.      Pulses: Normal pulses.   Pulmonary:      Effort: Pulmonary effort is normal. No respiratory distress.   Musculoskeletal: Normal range of motion.   Skin:     General: Skin is warm.      Findings: Rash (Mild swelling and rash noted to the nasal/facial area.) present.   Neurological:      Mental Status: She is alert and oriented to person, place, and time.   Psychiatric:          sleep apnea:  None    Diet:  Regular (no restrictions)    Frequency of exercise:  None    Duration of exercise:  N/A    Taking medications regularly:  Not Applicable    Barriers to taking medications:  Not applicable    Medication side effects:  Not applicable    Additional concerns today:  No    No concerns today        3/26/2024   General Health   How would you rate your overall physical health? (!) FAIR   Feel stress (tense, anxious, or unable to sleep) Only a little   (!) STRESS CONCERN      3/26/2024   Nutrition   Three or more servings of calcium each day? (!) I DON'T KNOW   Diet: Regular (no restrictions)   How many servings of fruit and vegetables per day? (!) 0-1   How many sweetened beverages each day? (!) 3         3/26/2024   Exercise   Days per week of moderate/strenous exercise 1 day   Average minutes spent exercising at this level 60 min   (!) EXERCISE CONCERN      3/26/2024   Social Factors   Frequency of gathering with friends or relatives Once a week   Worry food won't last until get money to buy more No   Food not last or not have enough money for food? No   Do you have housing?  Yes   Are you worried about losing your housing? No   Lack of transportation? No   Unable to get utilities (heat,electricity)? No         3/26/2024   Dental   Dentist two times every year? (!) NO         3/26/2024   TB Screening   Were you born outside of the [place]? Yes           Today's PHQ-2 Score:       2/22/2024     2:05 PM   PHQ-2 ( 1999 [employer])   Q1: Little interest or pleasure in doing things 0   Q2: Feeling down, depressed or hopeless 0   PHQ-2 Score 0   Q1: Little interest or pleasure in doing things Not at all   Q2: Feeling down, depressed or hopeless Not at all   PHQ-2 Score 0         3/26/2024   Substance Use   Alcohol more than 3/day or more than 7/wk No   Do you use any other substances recreationally? No     Social History     Tobacco Use    Smoking status: Never     Passive exposure: Past    Smokeless  "tobacco: Never   Vaping Use    Vaping Use: Never used   Substance Use Topics    Alcohol use: Not Currently    Drug use: Never             3/26/2024   Breast Cancer Screening   Family history of breast, colon, or ovarian cancer? No / Unknown      Mammogram Screening - Patient under 40 years of age: Routine Mammogram Screening not recommended.           3/26/2024   One time HIV Screening   Previous HIV test? I don't know         3/26/2024   STI Screening   New sexual partner(s) since last STI/HIV test? (!) YES      History of abnormal Pap smear: NO - age 30- 65 PAP every 3 years recommended             3/26/2024   Contraception/Family Planning   Questions about contraception or family planning No        Reviewed and updated as needed this visit by Provider   Tobacco  Allergies  Meds  Problems  Med Hx  Surg Hx  Fam Hx                     Objective    Exam  /73   Pulse 70   Temp 98.3  F (36.8  C) (Oral)   Resp 12   Ht 1.53 m (5' 0.25\")   Wt 65.8 kg (145 lb)   LMP 03/11/2024 (Exact Date)   SpO2 98%   BMI 28.08 kg/m     Estimated body mass index is 28.08 kg/m  as calculated from the following:    Height as of this encounter: 1.53 m (5' 0.25\").    Weight as of this encounter: 65.8 kg (145 lb).    Physical Exam  GENERAL: alert and no distress  EYES: Eyes grossly normal to inspection, PERRL and conjunctivae and sclerae normal  HENT: ear canals and TM's normal, nose and mouth without ulcers or lesions  NECK: no adenopathy, no asymmetry, masses, or scars  RESP: lungs clear to auscultation - no rales, rhonchi or wheezes  CV: regular rate and rhythm, normal S1 S2, no S3 or S4, no murmur, click or rub, no peripheral edema  ABDOMEN: soft, nontender, no hepatosplenomegaly, no masses and bowel sounds normal  MS: no gross musculoskeletal defects noted, no edema  SKIN: no suspicious lesions or rashes  NEURO: Normal strength and tone, mentation intact and speech normal  PSYCH: mentation appears normal, affect " Mood and Affect: Mood normal.         Behavior: Behavior normal.         ED Course        Procedures               Critical Care time:  none            Results for orders placed or performed during the hospital encounter of 12/31/20 (from the past 24 hour(s))   Streptococcus A Rapid Scr w Reflx to PCR    Specimen: Throat   Result Value Ref Range    Strep Specimen Description Throat     Streptococcus Group A Rapid Screen Negative NEG^Negative   Symptomatic Influenza A/B & SARS-CoV2 (COVID-19) Virus PCR Multiplex    Specimen: Nasopharyngeal   Result Value Ref Range    Flu A/B & SARS-COV-2 PCR Source Nasopharyngeal     SARS-CoV-2 PCR Result NEGATIVE     Influenza A PCR Negative NEG^Negative    Influenza B PCR Negative NEG^Negative    Respiratory Syncytial Virus PCR (Note)     Flu A/B & SARS-CoV-2 PCR Comment (Note)    Mononucleosis screen   Result Value Ref Range    Mononucleosis Screen Positive (A) NEG^Negative           Assessments & Plan (with Medical Decision Making)  20-year-old female to the ER not feeling well exam findings consistent with acute mononucleosis.  Patient instructed in the need to increase her fluid intake.  Also given written instructions on signs and symptoms of concern and when to return to the ER.  Tylenol ibuprofen as well as Zyrtec can be used as needed for symptoms.     I have reviewed the nursing notes.    I have reviewed the findings, diagnosis, plan and need for follow up with the patient.       New Prescriptions    ACETAMINOPHEN (TYLENOL) 500 MG TABLET    Take 1-2 tablets (500-1,000 mg) by mouth every 6 hours as needed for fever or pain    CETIRIZINE (ZYRTEC) 10 MG TABLET    1 tab up to twice a day for itch/rash, congestion    IBUPROFEN (ADVIL/MOTRIN) 200 MG TABLET    Take 3 tablets (600 mg) by mouth every 6 hours as needed for pain or fever (TAKE WITH FOOD) TAKE WITH FOOD AS NEEDED FOR PAIN            I verbally discussed the findings of the evaluation today in the ER. I have verbally  discussed with Felicia the suggested treatment(s) as described in the discharge instructions and handouts. I have prescribed the above listed medications and instructed her on appropriate use of these medications.      I have verbally suggested she follow-up in her clinic or return to the ER for increased symptoms. See the follow-up recommendations documented  in the after visit summary in this visit's EPIC chart.    Final diagnoses:   Mononucleosis syndrome       12/31/2020   [facility] EMERGENCY DEPT     Kamlesh Salguero, DO  12/31/20 2157       Kamlesh Salguero, DO  12/31/20 2157     normal/bright        Signed Electronically by: Jessica Weeks PA-C

## 2024-04-12 ENCOUNTER — ORDERS ONLY (AUTO-RELEASED) (OUTPATIENT)
Dept: EMERGENCY MEDICINE | Facility: CLINIC | Age: 24
End: 2024-04-12
Payer: COMMERCIAL

## 2024-04-12 ENCOUNTER — APPOINTMENT (OUTPATIENT)
Dept: GENERAL RADIOLOGY | Facility: CLINIC | Age: 24
End: 2024-04-12
Attending: NURSE PRACTITIONER
Payer: COMMERCIAL

## 2024-04-12 ENCOUNTER — HOSPITAL ENCOUNTER (EMERGENCY)
Facility: CLINIC | Age: 24
Discharge: HOME | End: 2024-04-12
Attending: NURSE PRACTITIONER | Admitting: NURSE PRACTITIONER
Payer: COMMERCIAL

## 2024-04-12 VITALS
BODY MASS INDEX: 21.05 KG/M2 | TEMPERATURE: 98.4 F | HEIGHT: 66 IN | RESPIRATION RATE: 15 BRPM | DIASTOLIC BLOOD PRESSURE: 68 MMHG | HEART RATE: 62 BPM | WEIGHT: 131 LBS | OXYGEN SATURATION: 100 % | SYSTOLIC BLOOD PRESSURE: 118 MMHG

## 2024-04-12 DIAGNOSIS — R00.2 PALPITATIONS: ICD-10-CM

## 2024-04-12 LAB
ALBUMIN SERPL BCG-MCNC: 4.2 G/DL (ref 3.5–5.2)
ALP SERPL-CCNC: 68 U/L (ref 40–150)
ALT SERPL W P-5'-P-CCNC: 8 U/L (ref 0–50)
ANION GAP SERPL CALCULATED.3IONS-SCNC: 11 MMOL/L (ref 7–15)
AST SERPL W P-5'-P-CCNC: 21 U/L (ref 0–45)
BASOPHILS # BLD AUTO: 0 10E3/UL (ref 0–0.2)
BASOPHILS NFR BLD AUTO: 1 %
BILIRUB SERPL-MCNC: 0.3 MG/DL
BUN SERPL-MCNC: 13.9 MG/DL (ref 6–20)
CALCIUM SERPL-MCNC: 9.6 MG/DL (ref 8.6–10)
CHLORIDE SERPL-SCNC: 102 MMOL/L (ref 98–107)
CREAT SERPL-MCNC: 0.81 MG/DL (ref 0.51–0.95)
DEPRECATED HCO3 PLAS-SCNC: 24 MMOL/L (ref 22–29)
EGFRCR SERPLBLD CKD-EPI 2021: >90 ML/MIN/1.73M2
EOSINOPHIL # BLD AUTO: 0.1 10E3/UL (ref 0–0.7)
EOSINOPHIL NFR BLD AUTO: 1 %
ERYTHROCYTE [DISTWIDTH] IN BLOOD BY AUTOMATED COUNT: 12.4 % (ref 10–15)
GLUCOSE SERPL-MCNC: 92 MG/DL (ref 70–99)
HCT VFR BLD AUTO: 36.3 % (ref 35–47)
HGB BLD-MCNC: 12.2 G/DL (ref 11.7–15.7)
IMM GRANULOCYTES # BLD: 0 10E3/UL
IMM GRANULOCYTES NFR BLD: 0 %
LYMPHOCYTES # BLD AUTO: 1.9 10E3/UL (ref 0.8–5.3)
LYMPHOCYTES NFR BLD AUTO: 30 %
MCH RBC QN AUTO: 30.2 PG (ref 26.5–33)
MCHC RBC AUTO-ENTMCNC: 33.6 G/DL (ref 31.5–36.5)
MCV RBC AUTO: 90 FL (ref 78–100)
MONOCYTES # BLD AUTO: 0.4 10E3/UL (ref 0–1.3)
MONOCYTES NFR BLD AUTO: 7 %
NEUTROPHILS # BLD AUTO: 4 10E3/UL (ref 1.6–8.3)
NEUTROPHILS NFR BLD AUTO: 62 %
NRBC # BLD AUTO: 0 10E3/UL
NRBC BLD AUTO-RTO: 0 /100
PLATELET # BLD AUTO: 252 10E3/UL (ref 150–450)
POTASSIUM SERPL-SCNC: 4.1 MMOL/L (ref 3.4–5.3)
PROT SERPL-MCNC: 7.3 G/DL (ref 6.4–8.3)
RBC # BLD AUTO: 4.04 10E6/UL (ref 3.8–5.2)
SODIUM SERPL-SCNC: 137 MMOL/L (ref 135–145)
TROPONIN T SERPL HS-MCNC: <6 NG/L
TSH SERPL DL<=0.005 MIU/L-ACNC: 2.61 UIU/ML (ref 0.3–4.2)
WBC # BLD AUTO: 6.4 10E3/UL (ref 4–11)

## 2024-04-12 PROCEDURE — 71046 X-RAY EXAM CHEST 2 VIEWS: CPT

## 2024-04-12 PROCEDURE — 99285 EMERGENCY DEPT VISIT HI MDM: CPT | Mod: 25

## 2024-04-12 PROCEDURE — 85004 AUTOMATED DIFF WBC COUNT: CPT | Performed by: NURSE PRACTITIONER

## 2024-04-12 PROCEDURE — 36415 COLL VENOUS BLD VENIPUNCTURE: CPT | Performed by: NURSE PRACTITIONER

## 2024-04-12 PROCEDURE — 93010 ELECTROCARDIOGRAM REPORT: CPT | Performed by: NURSE PRACTITIONER

## 2024-04-12 PROCEDURE — 84443 ASSAY THYROID STIM HORMONE: CPT | Performed by: NURSE PRACTITIONER

## 2024-04-12 PROCEDURE — 84484 ASSAY OF TROPONIN QUANT: CPT | Performed by: NURSE PRACTITIONER

## 2024-04-12 PROCEDURE — 80053 COMPREHEN METABOLIC PANEL: CPT | Performed by: NURSE PRACTITIONER

## 2024-04-12 PROCEDURE — 99284 EMERGENCY DEPT VISIT MOD MDM: CPT | Mod: 25 | Performed by: NURSE PRACTITIONER

## 2024-04-12 ASSESSMENT — COLUMBIA-SUICIDE SEVERITY RATING SCALE - C-SSRS
2. HAVE YOU ACTUALLY HAD ANY THOUGHTS OF KILLING YOURSELF IN THE PAST MONTH?: NO
6. HAVE YOU EVER DONE ANYTHING, STARTED TO DO ANYTHING, OR PREPARED TO DO ANYTHING TO END YOUR LIFE?: NO
1. IN THE PAST MONTH, HAVE YOU WISHED YOU WERE DEAD OR WISHED YOU COULD GO TO SLEEP AND NOT WAKE UP?: NO

## 2024-04-12 ASSESSMENT — ACTIVITIES OF DAILY LIVING (ADL)
ADLS_ACUITY_SCORE: 35

## 2024-04-12 NOTE — DISCHARGE INSTRUCTIONS
Drink plenty of fluids to stay hydrated.  Follow-up with your primary care provider in clinic on Monday as scheduled for recheck.  I did place order for a Zio Patch heart monitor that will come to you in the mail.  You will wear this for 3 days and then you return it per the instructions that are provided.  Make an appointment for follow-up with your primary care provider about a 1-2 weeks after you complete the heart monitor.    Return to the emergency department for fevers, chest pain, shortness of breath, rapid heart rate, or worse in any way.

## 2024-04-12 NOTE — ED TRIAGE NOTES
Pt states that she has had a pounding or a fluttering feeling in her chest/heart.  Occasional shortness of breath with going up the stairs.  Pt states that symptoms started last week.      Triage Assessment (Adult)       Row Name 04/12/24 1412          Triage Assessment    Airway WDL WDL        Respiratory WDL    Respiratory WDL WDL        Skin Circulation/Temperature WDL    Skin Circulation/Temperature WDL WDL        Cardiac WDL    Cardiac WDL X        Peripheral/Neurovascular WDL    Peripheral Neurovascular WDL WDL        Cognitive/Neuro/Behavioral WDL    Cognitive/Neuro/Behavioral WDL WDL

## 2024-04-13 NOTE — ED PROVIDER NOTES
"  History     Chief Complaint   Patient presents with    Palpitations     HPI  Felicia Jewell is a 23 year old female who presents for evaluation of palpitation.  Patient reports feeling like her heart is pounding in her chest.  This has been intermittent over the last couple days.  When she is having the symptoms she feels a little bit short of breath.  Denies any chest pain with the symptoms.  Denies any recent fever or chills.  Denies any cough or congestion.  Denies nausea or vomiting.  Denies diarrhea or constipation.  She does not drink an excessive amount of caffeine.  She takes no stimulant medications.  Denies illicit drug use.  Non-smoker.  She does not drink alcohol.  She is getting ready to graduate from college and has been feeling a lot of stress in this regard.    Allergies:  Allergies   Allergen Reactions    Penicillins        Problem List:    There are no problems to display for this patient.       Past Medical History:    No past medical history on file.    Past Surgical History:    No past surgical history on file.    Family History:    No family history on file.    Social History:  Marital Status:  Single [1]  Social History     Tobacco Use    Smoking status: Never    Smokeless tobacco: Never   Substance Use Topics    Alcohol use: Never    Drug use: Never        Medications:    cetirizine (ZYRTEC) 10 MG tablet  desogestrel-ethinyl estradiol (APRI) 0.15-30 MG-MCG tablet          Review of Systems  As mentioned above in the history present illness. All other systems were reviewed and are negative.    Physical Exam   BP: 126/88  Pulse: 100  Temp: 98.4  F (36.9  C)  Resp: 18  Height: 167.6 cm (5' 6\")  Weight: 59.4 kg (131 lb)  SpO2: 99 %      Physical Exam  Constitutional:       General: She is not in acute distress.     Appearance: Normal appearance. She is well-developed. She is not ill-appearing.   HENT:      Head: Normocephalic and atraumatic.      Right Ear: External ear normal.      " Left Ear: External ear normal.      Nose: Nose normal.      Mouth/Throat:      Mouth: Mucous membranes are moist.   Eyes:      Conjunctiva/sclera: Conjunctivae normal.   Cardiovascular:      Rate and Rhythm: Normal rate and regular rhythm.      Heart sounds: Normal heart sounds. No murmur heard.  Pulmonary:      Effort: Pulmonary effort is normal. No respiratory distress.      Breath sounds: Normal breath sounds.   Musculoskeletal:         General: Normal range of motion.   Skin:     General: Skin is warm and dry.      Findings: No rash.   Neurological:      General: No focal deficit present.      Mental Status: She is alert and oriented to person, place, and time.         ED Course        Procedures              EKG Interpretation:      Interpreted by RAFFAELE Berg CNP  Time reviewed:2:20pm   Symptoms at time of EKG: None   Rhythm: Normal sinus, with rate variation   Rate: Normal  Axis: Normal  Ectopy: None  Conduction: Normal  ST Segments/ T Waves: No ST-T wave changes and No acute ischemic changes  Q Waves: None  Comparison to prior: No old EKG available    Clinical Impression: NSR with no acute ischemic changes.         Results for orders placed or performed during the hospital encounter of 04/12/24 (from the past 24 hour(s))   CBC with platelets differential    Narrative    The following orders were created for panel order CBC with platelets differential.  Procedure                               Abnormality         Status                     ---------                               -----------         ------                     CBC with platelets and d...[[id number]]                      Final result                 Please view results for these tests on the individual orders.   Comprehensive metabolic panel   Result Value Ref Range    Sodium 137 135 - 145 mmol/L    Potassium 4.1 3.4 - 5.3 mmol/L    Carbon Dioxide (CO2) 24 22 - 29 mmol/L    Anion Gap 11 7 - 15 mmol/L    Urea Nitrogen 13.9 6.0 - 20.0  mg/dL    Creatinine 0.81 0.51 - 0.95 mg/dL    GFR Estimate >90 >60 mL/min/1.73m2    Calcium 9.6 8.6 - 10.0 mg/dL    Chloride 102 98 - 107 mmol/L    Glucose 92 70 - 99 mg/dL    Alkaline Phosphatase 68 40 - 150 U/L    AST 21 0 - 45 U/L    ALT 8 0 - 50 U/L    Protein Total 7.3 6.4 - 8.3 g/dL    Albumin 4.2 3.5 - 5.2 g/dL    Bilirubin Total 0.3 <=1.2 mg/dL   Troponin T, High Sensitivity   Result Value Ref Range    Troponin T, High Sensitivity <6 <=14 ng/L   TSH with free T4 reflex   Result Value Ref Range    TSH 2.61 0.30 - 4.20 uIU/mL   CBC with platelets and differential   Result Value Ref Range    WBC Count 6.4 4.0 - 11.0 10e3/uL    RBC Count 4.04 3.80 - 5.20 10e6/uL    Hemoglobin 12.2 11.7 - 15.7 g/dL    Hematocrit 36.3 35.0 - 47.0 %    MCV 90 78 - 100 fL    MCH 30.2 26.5 - 33.0 pg    MCHC 33.6 31.5 - 36.5 g/dL    RDW 12.4 10.0 - 15.0 %    Platelet Count 252 150 - 450 10e3/uL    % Neutrophils 62 %    % Lymphocytes 30 %    % Monocytes 7 %    % Eosinophils 1 %    % Basophils 1 %    % Immature Granulocytes 0 %    NRBCs per 100 WBC 0 <1 /100    Absolute Neutrophils 4.0 1.6 - 8.3 10e3/uL    Absolute Lymphocytes 1.9 0.8 - 5.3 10e3/uL    Absolute Monocytes 0.4 0.0 - 1.3 10e3/uL    Absolute Eosinophils 0.1 0.0 - 0.7 10e3/uL    Absolute Basophils 0.0 0.0 - 0.2 10e3/uL    Absolute Immature Granulocytes 0.0 <=0.4 10e3/uL    Absolute NRBCs 0.0 10e3/uL   XR Chest 2 Views    Narrative    EXAM: XR CHEST 2 VIEWS  LOCATION: [facility]  DATE: 4/12/2024    INDICATION: Palpitations.  COMPARISON: None.      Impression    IMPRESSION: Negative chest. Lungs are clear. Normal heart size.         Medications - No data to display    Assessments & Plan (with Medical Decision Making)     No concerning lab findings including normal thyroid function and normal troponin.  EKG is normal sinus rhythm with no acute ischemic changes.  No worrisome arrhythmia.  No PVCs.  Chest x-ray is negative for infiltrate or  consolidation.  Normal heart size.  No murmur noted on exam.  She is asymptomatic here.  This could be stress-induced given her report of having stress related to college.  However, given her symptoms I do recommend a Zio patch monitor for further evaluation.  I have placed order for this.  She was instructed to have close follow-up with her PCP and she does have an appointment on Monday.  Plan as follows:  Drink plenty of fluids to stay hydrated.  Follow-up with your primary care provider in clinic on Monday as scheduled for recheck.  I did place order for a Zio Patch heart monitor that will come to you in the mail.  You will wear this for 3 days and then you return it per the instructions that are provided.  Make an appointment for follow-up with your primary care provider about a 1-2 weeks after you complete the heart monitor.    Return to the emergency department for fevers, chest pain, shortness of breath, rapid heart rate, or worse in any way.    Discharge Medication List as of 4/12/2024  5:01 PM          Final diagnoses:   Palpitations       4/12/2024   [facility] EMERGENCY DEPT       Faith Latif APRN CNP  04/12/24 2138

## 2024-04-27 ENCOUNTER — HOSPITAL ENCOUNTER (EMERGENCY)
Facility: CLINIC | Age: 24
Discharge: HOME | End: 2024-04-27
Attending: EMERGENCY MEDICINE | Admitting: EMERGENCY MEDICINE
Payer: COMMERCIAL

## 2024-04-27 VITALS
WEIGHT: 131 LBS | SYSTOLIC BLOOD PRESSURE: 123 MMHG | BODY MASS INDEX: 21.14 KG/M2 | TEMPERATURE: 100.3 F | DIASTOLIC BLOOD PRESSURE: 78 MMHG | HEART RATE: 116 BPM | OXYGEN SATURATION: 99 % | RESPIRATION RATE: 18 BRPM

## 2024-04-27 DIAGNOSIS — J06.9 VIRAL UPPER RESPIRATORY ILLNESS: ICD-10-CM

## 2024-04-27 PROCEDURE — 99282 EMERGENCY DEPT VISIT SF MDM: CPT | Performed by: EMERGENCY MEDICINE

## 2024-04-27 PROCEDURE — 99283 EMERGENCY DEPT VISIT LOW MDM: CPT | Performed by: EMERGENCY MEDICINE

## 2024-04-27 ASSESSMENT — COLUMBIA-SUICIDE SEVERITY RATING SCALE - C-SSRS
1. IN THE PAST MONTH, HAVE YOU WISHED YOU WERE DEAD OR WISHED YOU COULD GO TO SLEEP AND NOT WAKE UP?: NO
6. HAVE YOU EVER DONE ANYTHING, STARTED TO DO ANYTHING, OR PREPARED TO DO ANYTHING TO END YOUR LIFE?: NO
2. HAVE YOU ACTUALLY HAD ANY THOUGHTS OF KILLING YOURSELF IN THE PAST MONTH?: NO

## 2024-04-27 ASSESSMENT — ACTIVITIES OF DAILY LIVING (ADL): ADLS_ACUITY_SCORE: 35
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
## 2024-04-27 NOTE — DISCHARGE INSTRUCTIONS
Since you do not have a bite, scratch, or contact with mucous membranes of the bat, and you did not have any open wounds on your skin, it is highly unlikely for rabies transmission.  You do not need to have the rabies vaccine today    Your viral illness may improve over the next few days.  Continue to take Tylenol and ibuprofen, rest, drink plenty of fluids.  If any new or worsening symptoms develop, do not hesitate to return to the emergency room for evaluation
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
## 2024-04-30 PROCEDURE — 93244 EXT ECG>48HR<7D REV&INTERPJ: CPT | Performed by: INTERNAL MEDICINE

## 2024-05-22 ENCOUNTER — HOSPITAL ENCOUNTER (EMERGENCY)
Facility: CLINIC | Age: 24
Discharge: HOME | End: 2024-05-22
Attending: PHYSICIAN ASSISTANT | Admitting: PHYSICIAN ASSISTANT
Payer: COMMERCIAL

## 2024-05-22 VITALS
OXYGEN SATURATION: 100 % | HEART RATE: 86 BPM | TEMPERATURE: 98 F | RESPIRATION RATE: 18 BRPM | WEIGHT: 125 LBS | BODY MASS INDEX: 20.18 KG/M2 | SYSTOLIC BLOOD PRESSURE: 112 MMHG | DIASTOLIC BLOOD PRESSURE: 70 MMHG

## 2024-05-22 DIAGNOSIS — R51.9 THROBBING HEADACHE: ICD-10-CM

## 2024-05-22 PROCEDURE — 96361 HYDRATE IV INFUSION ADD-ON: CPT | Performed by: PHYSICIAN ASSISTANT

## 2024-05-22 PROCEDURE — 96374 THER/PROPH/DIAG INJ IV PUSH: CPT | Performed by: PHYSICIAN ASSISTANT

## 2024-05-22 PROCEDURE — 250N000011 HC RX IP 250 OP 636: Mod: JZ | Performed by: PHYSICIAN ASSISTANT

## 2024-05-22 PROCEDURE — 99283 EMERGENCY DEPT VISIT LOW MDM: CPT | Performed by: PHYSICIAN ASSISTANT

## 2024-05-22 PROCEDURE — 99284 EMERGENCY DEPT VISIT MOD MDM: CPT | Mod: 25 | Performed by: PHYSICIAN ASSISTANT

## 2024-05-22 PROCEDURE — 258N000003 HC RX IP 258 OP 636: Performed by: PHYSICIAN ASSISTANT

## 2024-05-22 PROCEDURE — 96375 TX/PRO/DX INJ NEW DRUG ADDON: CPT | Performed by: PHYSICIAN ASSISTANT

## 2024-05-22 RX ORDER — METOCLOPRAMIDE HYDROCHLORIDE 5 MG/ML
5 INJECTION INTRAMUSCULAR; INTRAVENOUS ONCE
Status: COMPLETED | OUTPATIENT
Start: 2024-05-22 | End: 2024-05-22

## 2024-05-22 RX ORDER — DIPHENHYDRAMINE HYDROCHLORIDE 50 MG/ML
25 INJECTION INTRAMUSCULAR; INTRAVENOUS ONCE
Status: COMPLETED | OUTPATIENT
Start: 2024-05-22 | End: 2024-05-22

## 2024-05-22 RX ORDER — KETOROLAC TROMETHAMINE 15 MG/ML
15 INJECTION, SOLUTION INTRAMUSCULAR; INTRAVENOUS ONCE
Status: COMPLETED | OUTPATIENT
Start: 2024-05-22 | End: 2024-05-22

## 2024-05-22 RX ADMIN — KETOROLAC TROMETHAMINE 15 MG: 15 INJECTION, SOLUTION INTRAMUSCULAR; INTRAVENOUS at 15:11

## 2024-05-22 RX ADMIN — SODIUM CHLORIDE 1000 ML: 9 INJECTION, SOLUTION INTRAVENOUS at 15:08

## 2024-05-22 RX ADMIN — DIPHENHYDRAMINE HYDROCHLORIDE 25 MG: 50 INJECTION, SOLUTION INTRAMUSCULAR; INTRAVENOUS at 15:09

## 2024-05-22 RX ADMIN — METOCLOPRAMIDE 5 MG: 5 INJECTION, SOLUTION INTRAMUSCULAR; INTRAVENOUS at 15:10

## 2024-05-22 ASSESSMENT — ACTIVITIES OF DAILY LIVING (ADL)
ADLS_ACUITY_SCORE: 35
ADLS_ACUITY_SCORE: 35

## 2024-05-22 ASSESSMENT — COLUMBIA-SUICIDE SEVERITY RATING SCALE - C-SSRS
6. HAVE YOU EVER DONE ANYTHING, STARTED TO DO ANYTHING, OR PREPARED TO DO ANYTHING TO END YOUR LIFE?: NO
1. IN THE PAST MONTH, HAVE YOU WISHED YOU WERE DEAD OR WISHED YOU COULD GO TO SLEEP AND NOT WAKE UP?: NO
2. HAVE YOU ACTUALLY HAD ANY THOUGHTS OF KILLING YOURSELF IN THE PAST MONTH?: NO

## 2024-05-22 NOTE — DISCHARGE INSTRUCTIONS
I am glad you are feeling better.  If your headache returns, try over-the-counter Tylenol or ibuprofen.  If you do have any worsening concerns please do not hesitate to return to the emergency department.    Thank you for choosing [facility]'s Emergency Department. It was a pleasure taking care of you today. If you have any questions, please call [phone].    Mariel Comer PA-C

## 2024-05-22 NOTE — ED TRIAGE NOTES
Pt presents with concern for headache for past  3 days. Pt recently returned from [place]. Pt took Excedrin migraine last night which helped and then woke up with it again.          The patient is a 55y Male complaining of abdominal pain.

## 2024-08-31 ENCOUNTER — HEALTH MAINTENANCE LETTER (OUTPATIENT)
Age: 24
End: 2024-08-31